# Patient Record
(demographics unavailable — no encounter records)

---

## 2025-06-10 NOTE — HISTORY OF PRESENT ILLNESS
[FreeTextEntry1] : 83-year-old male sent in from nursing home following an evaluation in the hospital for renal masses  past medical history of hypertension, hyperlipidemia, DM, chronic back pain, BPH was seen in the ER having been brought in by his son for worsening back pain over the last month.  At baseline patient has been suffering from chronic lower back pain for year, however over the past month the pain has worsened. Patient has no fever/chills, chest pain/shortness of breath, abdominal pain, nausea/vomiting/diarrhea. Son states he is no longer able to take care of his father and is requesting rehab placement.  CT scan 4/2025 KIDNEYS/URETERS: No hydronephrosis. Multiple bilateral renal cysts. 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 For example:  -A 2.9 x 1.8 cm heterogeneous lesion along the medial aspect of the left kidney (3/147.) -A 2.3 x 2.2 cm hyperattenuating exophytic left renal upper pole lesion (3/118.) -A 2.8 x 3.4 cm heterogeneous right renal interpolar lesion with associated calcification (3/165.)  Renal and Bladder US 4/2025 FINDINGS: Right kidney: 12 cm. No hydronephrosis or calculi. Multiple cysts the largest measuring 7.6 x 7.4 cm. Additional cyst with apparent calcified septations measuring 3.3 x 3.3 cm. Left kidney: Poorly visualized. Urinary bladder: Bilateral ureteral jets are visualized. Prevoid volume measures 209.1 cc. Patient did not void. IMPRESSION: No right hydronephrosis. Left kidney is poorly visualized. Septated right renal cyst as described. Recommend outpatient MRI renal with and without contrast for further characterization.  In the office he is combative and non-compliant he refuses to speak to us through a  - and he speaks only [place] the rehab facility did not have any new information on the patient and upon approaching the patient he became combative and refused to be seen

## 2025-06-10 NOTE — ASSESSMENT
[FreeTextEntry1] : 83-year-old male sent in from nursing home following an evaluation in the hospital for renal masses  past medical history of hypertension, hyperlipidemia, DM, chronic back pain, BPH was seen in the ER having been brought in by his son for worsening back pain over the last month.  At baseline patient has been suffering from chronic lower back pain for year, however over the past month the pain has worsened. Patient has no fever/chills, chest pain/shortness of breath, abdominal pain, nausea/vomiting/diarrhea. Son states he is no longer able to take care of his father and is requesting rehab placement.  CT scan 4/2025 KIDNEYS/URETERS: No hydronephrosis. Multiple bilateral renal cysts. 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 For example:  -A 2.9 x 1.8 cm heterogeneous lesion along the medial aspect of the left kidney (3/147.) -A 2.3 x 2.2 cm hyperattenuating exophytic left renal upper pole lesion (3/118.) -A 2.8 x 3.4 cm heterogeneous right renal interpolar lesion with associated calcification (3/165.)  Renal and Bladder US 4/2025 FINDINGS: Right kidney: 12 cm. No hydronephrosis or calculi. Multiple cysts the largest measuring 7.6 x 7.4 cm. Additional cyst with apparent calcified septations measuring 3.3 x 3.3 cm. Left kidney: Poorly visualized. Urinary bladder: Bilateral ureteral jets are visualized. Prevoid volume measures 209.1 cc. Patient did not void. IMPRESSION: No right hydronephrosis. Left kidney is poorly visualized. Septated right renal cyst as described. Recommend outpatient MRI renal with and without contrast for further characterization.  In the office he is combative and non-compliant he refuses to speak to us through a  - and he speaks only [place] the rehab facility did not have any new information on the patient and upon approaching the patient he became combative and refused to be seen  Plan 82 yo with LEFT renal masses issues with voiding in the hospital - recommend to reschedule an apt with the patients son or family member present this was conveyed in writing on the discharge papers